# Patient Record
Sex: FEMALE | Race: WHITE | NOT HISPANIC OR LATINO | Employment: UNEMPLOYED | URBAN - METROPOLITAN AREA
[De-identification: names, ages, dates, MRNs, and addresses within clinical notes are randomized per-mention and may not be internally consistent; named-entity substitution may affect disease eponyms.]

---

## 2018-06-22 ENCOUNTER — TELEPHONE (OUTPATIENT)
Dept: RHEUMATOLOGY | Facility: CLINIC | Age: 53
End: 2018-06-22

## 2018-06-22 NOTE — TELEPHONE ENCOUNTER
----- Message from Albertina Sorensen sent at 6/22/2018  1:53 PM CDT -----  Pt is requesting a call from nurse to schedule a new pt appt.        Please call pt back at [phone]

## 2018-06-29 ENCOUNTER — OFFICE VISIT (OUTPATIENT)
Dept: RHEUMATOLOGY | Facility: CLINIC | Age: 53
End: 2018-06-29
Payer: COMMERCIAL

## 2018-06-29 ENCOUNTER — LAB VISIT (OUTPATIENT)
Dept: LAB | Facility: HOSPITAL | Age: 53
End: 2018-06-29
Attending: INTERNAL MEDICINE
Payer: COMMERCIAL

## 2018-06-29 VITALS
DIASTOLIC BLOOD PRESSURE: 79 MMHG | BODY MASS INDEX: 28.44 KG/M2 | HEART RATE: 69 BPM | HEIGHT: 62 IN | SYSTOLIC BLOOD PRESSURE: 133 MMHG | WEIGHT: 154.56 LBS

## 2018-06-29 DIAGNOSIS — M25.50 POLYARTHRALGIA: ICD-10-CM

## 2018-06-29 DIAGNOSIS — G47.62 NOCTURNAL LEG CRAMPS: ICD-10-CM

## 2018-06-29 DIAGNOSIS — M25.50 POLYARTHRALGIA: Primary | ICD-10-CM

## 2018-06-29 LAB
CCP AB SER IA-ACNC: <0.5 U/ML
CK SERPL-CCNC: 105 U/L
CRP SERPL-MCNC: 0.9 MG/L
ERYTHROCYTE [SEDIMENTATION RATE] IN BLOOD BY WESTERGREN METHOD: 11 MM/HR
RHEUMATOID FACT SERPL-ACNC: <10 IU/ML

## 2018-06-29 PROCEDURE — 86200 CCP ANTIBODY: CPT

## 2018-06-29 PROCEDURE — 86431 RHEUMATOID FACTOR QUANT: CPT

## 2018-06-29 PROCEDURE — 86038 ANTINUCLEAR ANTIBODIES: CPT

## 2018-06-29 PROCEDURE — 36415 COLL VENOUS BLD VENIPUNCTURE: CPT | Mod: PO

## 2018-06-29 PROCEDURE — 82085 ASSAY OF ALDOLASE: CPT

## 2018-06-29 PROCEDURE — 99999 PR PBB SHADOW E&M-EST. PATIENT-LVL III: CPT | Mod: PBBFAC,,, | Performed by: INTERNAL MEDICINE

## 2018-06-29 PROCEDURE — 85651 RBC SED RATE NONAUTOMATED: CPT | Mod: PO

## 2018-06-29 PROCEDURE — 80074 ACUTE HEPATITIS PANEL: CPT

## 2018-06-29 PROCEDURE — 82550 ASSAY OF CK (CPK): CPT

## 2018-06-29 PROCEDURE — 99204 OFFICE O/P NEW MOD 45 MIN: CPT | Mod: S$GLB,,, | Performed by: INTERNAL MEDICINE

## 2018-06-29 PROCEDURE — 86140 C-REACTIVE PROTEIN: CPT

## 2018-06-29 RX ORDER — ERGOCALCIFEROL 1.25 MG/1
50000 CAPSULE ORAL
COMMUNITY

## 2018-06-29 RX ORDER — MULTIVITAMIN
1 TABLET ORAL
COMMUNITY

## 2018-06-29 RX ORDER — LEVOTHYROXINE SODIUM 137 UG/1
137 TABLET ORAL
COMMUNITY

## 2018-06-29 RX ORDER — GABAPENTIN 100 MG/1
100 CAPSULE ORAL NIGHTLY
Qty: 30 CAPSULE | Refills: 6 | Status: SHIPPED | OUTPATIENT
Start: 2018-06-29 | End: 2019-06-29

## 2018-06-29 RX ORDER — ERGOCALCIFEROL (VITAMIN D2) 10 MCG
TABLET ORAL
COMMUNITY

## 2018-06-29 RX ORDER — HYDROCODONE BITARTRATE AND ACETAMINOPHEN 10; 325 MG/1; MG/1
1 TABLET ORAL
COMMUNITY

## 2018-06-29 NOTE — PROGRESS NOTES
CC:  Chief Complaint   Patient presents with    Pain     Legs Arms and Fingers       History of Present Illness:  Kathe Leigh a 52 y.o.yo female with a past medical history of Hashimoto's thyroiditis and chronic pain issues  Presents for evaluation of arthralgias involving fingers , hands , hips, feet , back   She has leg cramps , worse at night, sometimes associated with tingling  She can have finger cramps as well  nsaids give her GI  distress   Robaxin x 2 weeks , no longer helping   She wakes up in am , with stiffness , but then it is persistant all day and gets worse with activity   Rest relieves the pain   She is on narco as needed for pain management  She has chronic back pain/disc issues, she had MRIs done in the past which was suggestive of herniated disc per her  Pain mostly midline.  No incontinence or loss of sensations reported   Denies any joint swelling  She has h/o vitiligo of skin otherwise denies any other rashes.  Some dry eyes since the past Lasik  Surgery, no dry mouth.  Denies any oral or genital ulcers.  Positive for fatigue.  Denies any muscle weakness.  She is on Synthroid and her levels have been normal per her    Past Medical History:   Diagnosis Date    Anxiety     Scalp tenderness     Thyroid disease        Past Surgical History:   Procedure Laterality Date    HYSTERECTOMY      [place] APPENDECTOMY      Appendectomy         Social History   Substance Use Topics    Smoking status: Never Smoker    Smokeless tobacco: Not on file    Alcohol use No       Family History   Problem Relation Age of Onset    Chronic back pain Mother     Diabetes Mellitus Mother     Heart disease Mother     Hypertension Mother     Rheum arthritis Mother     Alcohol abuse Father     Diabetes Mellitus Father        Review of patient's allergies indicates:  No Known Allergies      Review of Systems:  Constitutional: Denies fever, chills. No recent weight changes.   Fatigue: yes  Muscle weakness:  no  Headaches: no new headaches  Eyes: No redness + dryness.  No recent visual changes.  ENT: Denies dry mouth. No oral or nasal ulcers.  Card: +chest pain.reproducible   Resp: No cough or sob.   Gastro: No nausea or vomiting.  No heartburn.  Constipation: yes  Diarrhea: no  Genito:  No dysuria.  No genital ulcers.  Skin: vitiligo   Raynauds:no   Neuro: No numbness / tingling hand/ feet intermittent   Psych: + anxiety  Endo:  no excess thirst.  Heme: no abnormal bleeding or bruising  Clots:none   Miscarriages : none     OBJECTIVE:     Vital Signs   Vitals:    06/29/18 0913   BP: 133/79   Pulse: 69     Physical Exam:  General Appearance:  NAD.   Gait: not favoring.  HEENT: PERRL.  Eyes not dry or injected.  No nasal ulcers.  Mouth not dry, no oral lesions.  Lymph: cervical, supraclavicular or axillary nodes: none abnormal   Cardio: no irregularity of S1 or S2.  No gallops or rubs.   Resp: Normal respiratory motion. Clear to auscultation bilaterally.   No abnormal chest conformation.  Abd: Soft, non-tender, nondistended.  No masses.   Skin: Head and neck,  and extremities examined. Vitiligo on hands   Neuro: Ox3.   Cranial nerves II-XII grossly intact.   Sensation intact  in both distal LE and upper extremities to light touch.  Musculoskeletal Exam:    Right Side Rheumatological Exam     Examination finds the shoulder, elbow, wrist, knee, 1st PIP, 1st MCP, 2nd PIP, 2nd MCP, 3rd PIP, 3rd MCP, 4th PIP, 4th MCP, 5th PIP and 5th MCP no synovitis     Others :  Hip: No synovitis   Ankle :No synovitis   Foot:No synovitis     Left Side Rheumatological Exam     Examination finds the shoulder, elbow, wrist, knee, 1st PIP, 1st MCP, 2nd PIP, 2nd MCP, 3rd PIP, 3rd MCP, 4th PIP, 4th MCP, 5th PIP and 5th MCP no synovitis     Others :  Hip:No synovitis   Ankle :No synovitis   Foot:No synovitis     Spine :  Occiput to wall :neg   Modified schobers :neg     Tender points:++  Muscle strength:Equal and full in all mm groups of the upper  and lower ext.    Laboratory:   Results for orders placed or performed during the hospital encounter of 11/14/12   Pregnancy, urine rapid   Result Value Ref Range    Preg Test, Ur Negative Negative   Urinalysis, Chemical Screen   Result Value Ref Range    Color, UA ORANGE (A) Yellow    Appearance, UA CLEAR Clear    Specific Gravity, UA text     Occult Blood UA text    UA Microscopic   Result Value Ref Range    RBC, UA 0-2 0 - 4 /hpf    WBC, UA 0-2 0 - 5 /hpf    Bacteria, UA Rare None-Occ    Squam Epithel, UA Moderate /hpf     Labs : cbc, cmp - normal ( see scanned )     Imaging :  Pending review    Notes reviewed  Other procedures:    ASSESSMENT/PLAN:     Polyarthralgia  -     gabapentin (NEURONTIN) 100 MG capsule; Take 1 capsule (100 mg total) by mouth every evening.  Dispense: 30 capsule; Refill: 6  -     CK; Standing  -     YOLY; Standing  -     Aldolase; Standing  -     Sedimentation rate; Standing  -     C-reactive protein; Standing  -     Rheumatoid factor; Future; Expected date: 06/29/2018  -     Cyclic citrul peptide antibody, IgG; Future; Expected date: 06/29/2018  -     Hepatitis panel, acute; Future    Nocturnal leg cramps  -     gabapentin (NEURONTIN) 100 MG capsule; Take 1 capsule (100 mg total) by mouth every evening.  Dispense: 30 capsule; Refill: 6  -     CK; Standing  -     YOLY; Standing  -     Aldolase; Standing  -     Sedimentation rate; Standing  -     C-reactive protein; Standing  -     Rheumatoid factor; Future; Expected date: 06/29/2018  -     Cyclic citrul peptide antibody, IgG; Future; Expected date: 06/29/2018  -     Hepatitis panel, acute; Future    1:  Generalized arthralgia of with leg cramps:  No synovitis noted on exam  Check RF/CCP/YOLY to rule out any autoimmune etiology  Check CK/ aldolase   Gabapentin 100 mg q.h.s., if well-tolerated will titrate to t.i.d. Dose  In future add Flexeril instead of Robaxin    2:  Chronic low back pain with history of herniated discs  On Norco p.r.n. for  pain management  Advised to bring her previous MRI records for review      Risks vs Benefits and potential side effects of medication prescribed today were discussed with patient. Medication literature given to patient up discharge  Went over uptodate information /literature on the meds prescribed today      Gabapentin - drowsy

## 2018-07-02 LAB
ANA SER QL IF: NORMAL
HAV IGM SERPL QL IA: NEGATIVE
HBV CORE IGM SERPL QL IA: NEGATIVE
HBV SURFACE AG SERPL QL IA: NEGATIVE
HCV AB SERPL QL IA: NEGATIVE

## 2018-07-04 LAB — ALDOLASE SERPL-CCNC: 3.2 U/L
